# Patient Record
Sex: FEMALE | Race: WHITE | NOT HISPANIC OR LATINO | URBAN - METROPOLITAN AREA
[De-identification: names, ages, dates, MRNs, and addresses within clinical notes are randomized per-mention and may not be internally consistent; named-entity substitution may affect disease eponyms.]

---

## 2018-02-15 ENCOUNTER — EMERGENCY (EMERGENCY)
Facility: HOSPITAL | Age: 43
LOS: 0 days | Discharge: HOME | End: 2018-02-15
Attending: EMERGENCY MEDICINE

## 2018-02-15 VITALS
SYSTOLIC BLOOD PRESSURE: 145 MMHG | TEMPERATURE: 97 F | HEIGHT: 63 IN | DIASTOLIC BLOOD PRESSURE: 91 MMHG | WEIGHT: 235.01 LBS | RESPIRATION RATE: 20 BRPM | HEART RATE: 90 BPM | OXYGEN SATURATION: 100 %

## 2018-02-15 VITALS
SYSTOLIC BLOOD PRESSURE: 138 MMHG | DIASTOLIC BLOOD PRESSURE: 67 MMHG | RESPIRATION RATE: 18 BRPM | HEART RATE: 87 BPM | TEMPERATURE: 97 F

## 2018-02-15 DIAGNOSIS — R10.13 EPIGASTRIC PAIN: ICD-10-CM

## 2018-02-15 DIAGNOSIS — R07.9 CHEST PAIN, UNSPECIFIED: ICD-10-CM

## 2018-02-15 LAB
ALBUMIN SERPL ELPH-MCNC: 3.7 G/DL — SIGNIFICANT CHANGE UP (ref 3–5.5)
ALP SERPL-CCNC: 80 U/L — SIGNIFICANT CHANGE UP (ref 30–115)
ALT FLD-CCNC: 16 U/L — SIGNIFICANT CHANGE UP (ref 0–41)
ANION GAP SERPL CALC-SCNC: 7 MMOL/L — SIGNIFICANT CHANGE UP (ref 7–14)
AST SERPL-CCNC: 17 U/L — SIGNIFICANT CHANGE UP (ref 0–41)
BASOPHILS # BLD AUTO: 0.05 K/UL — SIGNIFICANT CHANGE UP (ref 0–0.2)
BASOPHILS NFR BLD AUTO: 0.4 % — SIGNIFICANT CHANGE UP (ref 0–1)
BILIRUB SERPL-MCNC: 0.5 MG/DL — SIGNIFICANT CHANGE UP (ref 0.2–1.2)
BUN SERPL-MCNC: 13 MG/DL — SIGNIFICANT CHANGE UP (ref 10–20)
CALCIUM SERPL-MCNC: 9.1 MG/DL — SIGNIFICANT CHANGE UP (ref 8.5–10.1)
CHLORIDE SERPL-SCNC: 105 MMOL/L — SIGNIFICANT CHANGE UP (ref 98–110)
CK MB CFR SERPL CALC: 1.3 NG/ML — SIGNIFICANT CHANGE UP (ref 0.6–6.3)
CO2 SERPL-SCNC: 27 MMOL/L — SIGNIFICANT CHANGE UP (ref 17–32)
CREAT SERPL-MCNC: 0.8 MG/DL — SIGNIFICANT CHANGE UP (ref 0.7–1.5)
EOSINOPHIL # BLD AUTO: 0.22 K/UL — SIGNIFICANT CHANGE UP (ref 0–0.7)
EOSINOPHIL NFR BLD AUTO: 1.9 % — SIGNIFICANT CHANGE UP (ref 0–8)
GLUCOSE SERPL-MCNC: 110 MG/DL — SIGNIFICANT CHANGE UP (ref 70–110)
HCT VFR BLD CALC: 40.3 % — SIGNIFICANT CHANGE UP (ref 37–47)
HGB BLD-MCNC: 13.5 G/DL — LOW (ref 14–18)
IMM GRANULOCYTES NFR BLD AUTO: 0.7 % — HIGH (ref 0.1–0.3)
LYMPHOCYTES # BLD AUTO: 1.71 K/UL — SIGNIFICANT CHANGE UP (ref 1.2–3.4)
LYMPHOCYTES # BLD AUTO: 14.8 % — LOW (ref 20.5–51.1)
MCHC RBC-ENTMCNC: 26.2 PG — LOW (ref 27–31)
MCHC RBC-ENTMCNC: 33.5 G/DL — SIGNIFICANT CHANGE UP (ref 32–37)
MCV RBC AUTO: 78.1 FL — LOW (ref 81–91)
MONOCYTES # BLD AUTO: 0.47 K/UL — SIGNIFICANT CHANGE UP (ref 0.1–0.6)
MONOCYTES NFR BLD AUTO: 4.1 % — SIGNIFICANT CHANGE UP (ref 1.7–9.3)
NEUTROPHILS # BLD AUTO: 9.04 K/UL — HIGH (ref 1.4–6.5)
NEUTROPHILS NFR BLD AUTO: 78.1 % — HIGH (ref 42.2–75.2)
NRBC # BLD: 0 /100 WBCS — SIGNIFICANT CHANGE UP (ref 0–0)
PLATELET # BLD AUTO: 279 K/UL — SIGNIFICANT CHANGE UP (ref 130–400)
POTASSIUM SERPL-MCNC: 3.8 MMOL/L — SIGNIFICANT CHANGE UP (ref 3.5–5)
POTASSIUM SERPL-SCNC: 3.8 MMOL/L — SIGNIFICANT CHANGE UP (ref 3.5–5)
PROT SERPL-MCNC: 7 G/DL — SIGNIFICANT CHANGE UP (ref 6–8)
RBC # BLD: 5.16 M/UL — SIGNIFICANT CHANGE UP (ref 4.2–5.4)
RBC # FLD: 13.1 % — SIGNIFICANT CHANGE UP (ref 11.5–14.5)
SODIUM SERPL-SCNC: 139 MMOL/L — SIGNIFICANT CHANGE UP (ref 135–146)
TROPONIN I SERPL-MCNC: <0.02 NG/ML — SIGNIFICANT CHANGE UP (ref 0–0.05)
WBC # BLD: 11.57 K/UL — HIGH (ref 4.8–10.8)
WBC # FLD AUTO: 11.57 K/UL — HIGH (ref 4.8–10.8)

## 2018-02-15 RX ORDER — ASPIRIN/CALCIUM CARB/MAGNESIUM 324 MG
162 TABLET ORAL ONCE
Qty: 0 | Refills: 0 | Status: COMPLETED | OUTPATIENT
Start: 2018-02-15 | End: 2018-02-15

## 2018-02-15 RX ORDER — FAMOTIDINE 10 MG/ML
20 INJECTION INTRAVENOUS ONCE
Qty: 0 | Refills: 0 | Status: COMPLETED | OUTPATIENT
Start: 2018-02-15 | End: 2018-02-15

## 2018-02-15 RX ADMIN — Medication 162 MILLIGRAM(S): at 05:13

## 2018-02-15 RX ADMIN — FAMOTIDINE 20 MILLIGRAM(S): 10 INJECTION INTRAVENOUS at 05:13

## 2018-02-15 NOTE — ED PROVIDER NOTE - NS ED ROS FT
GENERAL: Denies fever/chills, loss of appetite/weight or fatigue.  SKIN: Denies rashes, abrasions, lacerations, ecchymosis, erythema, or edema.  HEAD: Denies headache, dizziness or trauma.  ENT: Denies earaches, discharge or hearing loss. Denies nasal discharge or epistaxis. Denies sore throat.   CARDIAC: + chest pain.   RESPIRATORY: Denies SOB, cough, hemoptysis or wheezing.   GI: Denies abdominal pain, n/v/d, bloody stools or melena.   MSK: Denies myalgias, bony deformity or pain.   NEURO: Denies paresthesias, tingling or weakness.

## 2018-02-15 NOTE — ED PROVIDER NOTE - OBJECTIVE STATEMENT
Pt is a 41 y/o Female, no PMHX, presents to ED with epigastric/abdominal pain since 7PM. Pt reports she took 81mg of ASA with no relief of symptoms. Pt reports she does have a hx of heartburn, but this felt different and kept her from sleeping so she decided to come to the ED for eval. Reports symptoms are not positional or exertional. Pt denies cardiac hx, family hx of cardiac disease, smoking, recent travel/sx, hx o DVT/PE, fever, chills, SOB, URI symptoms, abdominal pain, n/v/d, urinary symptoms.

## 2018-02-15 NOTE — ED PROVIDER NOTE - PROGRESS NOTE DETAILS
pt  continues to feel well - discussed and prionted  labs ekg and cxr for patient.  Patient does nto want to stay for second se of enzymes-  she will follow up

## 2018-02-15 NOTE — ED PROVIDER NOTE - ATTENDING CONTRIBUTION TO CARE
42yF  no pmhx non smoker no family hx of cardiac disease, no hx of cardiac disease herself, p/w indigestion, burning epigastric pain.  No SOB no CP no loc  no vomiting- no diarrhea  no change in stool color,  not worse on exertion, PE: alert well appaering cvs rrr reps cta abd soft nontender cvs rrr resp cta  abd soft nonteder  2+ radiail pulse b/l equal , no le edema Plan: labs  ekg cxr, low heart score - reasses

## 2018-02-15 NOTE — ED PROVIDER NOTE - PHYSICAL EXAMINATION
CONSTITUTIONAL: Awake, alert. Well-developed; well-nourished; in no distress. Non-toxic appearing.   SKIN: No rash, vesicles/lesion, abrasions or lacerations. Cap refill < 2 secs.   HEAD: Normocephalic; atraumatic.   EYES: Symmetrical, no discharge or signs of trauma. Conjunctiva and sclera clear.    ENT: Airway patent. Oropharynx clear with no erythema, exudates or tonsillar enlargement. Uvula midline.   NECK: Supple; non-tender.   CARD: No chest wall deformity or tenderness. S1, S2 normal; no murmurs, gallops, or rubs. Regular rate and rhythm.  RESP: Good air movement. Lungs CTAB. No crackles, wheezes, rales or rhonchi.  ABD: Soft; non-distended; non-tender.   EXT: No bony deformity or tenderness. Normal ROM x 4 extremities.   PSYCH: Cooperative, appropriate.

## 2018-02-15 NOTE — ED ADULT NURSE REASSESSMENT NOTE - COMFORT CARE
ambulated to bathroom/plan of care explained/repositioned/wait time explained/side rails up/warm blanket provided

## 2021-07-31 ENCOUNTER — TRANSCRIPTION ENCOUNTER (OUTPATIENT)
Age: 46
End: 2021-07-31

## 2021-11-19 NOTE — ED PROVIDER NOTE - NSTIMEPROVIDERCAREINITIATE_GEN_ER
Patient was advised of below results and recommendations per Dr. Olmstead.   Patient understood results without questions.  Script sent to patient preferred pharmacy.  Form at  for patient to complete.     15-Feb-2018 04:43

## 2022-01-07 ENCOUNTER — TRANSCRIPTION ENCOUNTER (OUTPATIENT)
Age: 47
End: 2022-01-07

## 2022-04-02 ENCOUNTER — TRANSCRIPTION ENCOUNTER (OUTPATIENT)
Age: 47
End: 2022-04-02

## 2022-04-20 ENCOUNTER — TRANSCRIPTION ENCOUNTER (OUTPATIENT)
Age: 47
End: 2022-04-20

## 2022-05-21 ENCOUNTER — EMERGENCY (EMERGENCY)
Facility: HOSPITAL | Age: 47
LOS: 0 days | Discharge: HOME | End: 2022-05-21
Attending: EMERGENCY MEDICINE | Admitting: EMERGENCY MEDICINE
Payer: COMMERCIAL

## 2022-05-21 VITALS
HEIGHT: 63 IN | TEMPERATURE: 97 F | HEART RATE: 77 BPM | WEIGHT: 229.94 LBS | OXYGEN SATURATION: 98 % | SYSTOLIC BLOOD PRESSURE: 158 MMHG | DIASTOLIC BLOOD PRESSURE: 85 MMHG | RESPIRATION RATE: 20 BRPM

## 2022-05-21 VITALS
HEART RATE: 70 BPM | DIASTOLIC BLOOD PRESSURE: 83 MMHG | SYSTOLIC BLOOD PRESSURE: 122 MMHG | TEMPERATURE: 98 F | RESPIRATION RATE: 16 BRPM | OXYGEN SATURATION: 99 %

## 2022-05-21 DIAGNOSIS — N20.0 CALCULUS OF KIDNEY: ICD-10-CM

## 2022-05-21 DIAGNOSIS — R11.2 NAUSEA WITH VOMITING, UNSPECIFIED: ICD-10-CM

## 2022-05-21 DIAGNOSIS — Z86.2 PERSONAL HISTORY OF DISEASES OF THE BLOOD AND BLOOD-FORMING ORGANS AND CERTAIN DISORDERS INVOLVING THE IMMUNE MECHANISM: ICD-10-CM

## 2022-05-21 DIAGNOSIS — K58.9 IRRITABLE BOWEL SYNDROME WITHOUT DIARRHEA: ICD-10-CM

## 2022-05-21 DIAGNOSIS — R10.12 LEFT UPPER QUADRANT PAIN: ICD-10-CM

## 2022-05-21 LAB
ALBUMIN SERPL ELPH-MCNC: 4.5 G/DL — SIGNIFICANT CHANGE UP (ref 3.5–5.2)
ALP SERPL-CCNC: 104 U/L — SIGNIFICANT CHANGE UP (ref 30–115)
ALT FLD-CCNC: 18 U/L — SIGNIFICANT CHANGE UP (ref 0–41)
ANION GAP SERPL CALC-SCNC: 10 MMOL/L — SIGNIFICANT CHANGE UP (ref 7–14)
APPEARANCE UR: ABNORMAL
AST SERPL-CCNC: 18 U/L — SIGNIFICANT CHANGE UP (ref 0–41)
BACTERIA # UR AUTO: ABNORMAL
BASOPHILS # BLD AUTO: 0.04 K/UL — SIGNIFICANT CHANGE UP (ref 0–0.2)
BASOPHILS NFR BLD AUTO: 0.3 % — SIGNIFICANT CHANGE UP (ref 0–1)
BILIRUB SERPL-MCNC: 0.4 MG/DL — SIGNIFICANT CHANGE UP (ref 0.2–1.2)
BILIRUB UR-MCNC: NEGATIVE — SIGNIFICANT CHANGE UP
BUN SERPL-MCNC: 15 MG/DL — SIGNIFICANT CHANGE UP (ref 10–20)
CALCIUM SERPL-MCNC: 9.4 MG/DL — SIGNIFICANT CHANGE UP (ref 8.5–10.1)
CHLORIDE SERPL-SCNC: 104 MMOL/L — SIGNIFICANT CHANGE UP (ref 98–110)
CO2 SERPL-SCNC: 26 MMOL/L — SIGNIFICANT CHANGE UP (ref 17–32)
COD CRY URNS QL: NEGATIVE — SIGNIFICANT CHANGE UP
COLOR SPEC: YELLOW — SIGNIFICANT CHANGE UP
CREAT SERPL-MCNC: 0.7 MG/DL — SIGNIFICANT CHANGE UP (ref 0.7–1.5)
DIFF PNL FLD: ABNORMAL
EGFR: 106 ML/MIN/1.73M2 — SIGNIFICANT CHANGE UP
EOSINOPHIL # BLD AUTO: 0.29 K/UL — SIGNIFICANT CHANGE UP (ref 0–0.7)
EOSINOPHIL NFR BLD AUTO: 2.5 % — SIGNIFICANT CHANGE UP (ref 0–8)
EPI CELLS # UR: ABNORMAL /HPF
GLUCOSE SERPL-MCNC: 123 MG/DL — HIGH (ref 70–99)
GLUCOSE UR QL: NEGATIVE MG/DL — SIGNIFICANT CHANGE UP
GRAN CASTS # UR COMP ASSIST: NEGATIVE — SIGNIFICANT CHANGE UP
HCT VFR BLD CALC: 43 % — SIGNIFICANT CHANGE UP (ref 37–47)
HGB BLD-MCNC: 14.3 G/DL — SIGNIFICANT CHANGE UP (ref 12–16)
HYALINE CASTS # UR AUTO: NEGATIVE — SIGNIFICANT CHANGE UP
IMM GRANULOCYTES NFR BLD AUTO: 0.8 % — HIGH (ref 0.1–0.3)
KETONES UR-MCNC: NEGATIVE — SIGNIFICANT CHANGE UP
LEUKOCYTE ESTERASE UR-ACNC: ABNORMAL
LIDOCAIN IGE QN: 35 U/L — SIGNIFICANT CHANGE UP (ref 7–60)
LYMPHOCYTES # BLD AUTO: 1.03 K/UL — LOW (ref 1.2–3.4)
LYMPHOCYTES # BLD AUTO: 9 % — LOW (ref 20.5–51.1)
MCHC RBC-ENTMCNC: 26.6 PG — LOW (ref 27–31)
MCHC RBC-ENTMCNC: 33.3 G/DL — SIGNIFICANT CHANGE UP (ref 32–37)
MCV RBC AUTO: 79.9 FL — LOW (ref 81–99)
MONOCYTES # BLD AUTO: 0.45 K/UL — SIGNIFICANT CHANGE UP (ref 0.1–0.6)
MONOCYTES NFR BLD AUTO: 3.9 % — SIGNIFICANT CHANGE UP (ref 1.7–9.3)
NEUTROPHILS # BLD AUTO: 9.6 K/UL — HIGH (ref 1.4–6.5)
NEUTROPHILS NFR BLD AUTO: 83.5 % — HIGH (ref 42.2–75.2)
NITRITE UR-MCNC: NEGATIVE — SIGNIFICANT CHANGE UP
NRBC # BLD: 0 /100 WBCS — SIGNIFICANT CHANGE UP (ref 0–0)
PH UR: 6 — SIGNIFICANT CHANGE UP (ref 5–8)
PLATELET # BLD AUTO: 267 K/UL — SIGNIFICANT CHANGE UP (ref 130–400)
POTASSIUM SERPL-MCNC: 5.4 MMOL/L — HIGH (ref 3.5–5)
POTASSIUM SERPL-SCNC: 5.4 MMOL/L — HIGH (ref 3.5–5)
PROT SERPL-MCNC: 7 G/DL — SIGNIFICANT CHANGE UP (ref 6–8)
PROT UR-MCNC: 30 MG/DL
RBC # BLD: 5.38 M/UL — SIGNIFICANT CHANGE UP (ref 4.2–5.4)
RBC # FLD: 13.2 % — SIGNIFICANT CHANGE UP (ref 11.5–14.5)
RBC CASTS # UR COMP ASSIST: ABNORMAL /HPF
SODIUM SERPL-SCNC: 140 MMOL/L — SIGNIFICANT CHANGE UP (ref 135–146)
SP GR SPEC: 1.02 — SIGNIFICANT CHANGE UP (ref 1.01–1.03)
TRI-PHOS CRY UR QL COMP ASSIST: NEGATIVE — SIGNIFICANT CHANGE UP
URATE CRY FLD QL MICRO: NEGATIVE — SIGNIFICANT CHANGE UP
UROBILINOGEN FLD QL: 0.2 MG/DL — SIGNIFICANT CHANGE UP
WBC # BLD: 11.5 K/UL — HIGH (ref 4.8–10.8)
WBC # FLD AUTO: 11.5 K/UL — HIGH (ref 4.8–10.8)
WBC UR QL: ABNORMAL /HPF

## 2022-05-21 PROCEDURE — 99285 EMERGENCY DEPT VISIT HI MDM: CPT

## 2022-05-21 PROCEDURE — 74176 CT ABD & PELVIS W/O CONTRAST: CPT | Mod: 26,MA

## 2022-05-21 RX ORDER — CEFDINIR 250 MG/5ML
1 POWDER, FOR SUSPENSION ORAL
Qty: 14 | Refills: 0
Start: 2022-05-21 | End: 2022-05-27

## 2022-05-21 RX ORDER — KETOROLAC TROMETHAMINE 30 MG/ML
15 SYRINGE (ML) INJECTION ONCE
Refills: 0 | Status: DISCONTINUED | OUTPATIENT
Start: 2022-05-21 | End: 2022-05-21

## 2022-05-21 RX ORDER — ONDANSETRON 8 MG/1
4 TABLET, FILM COATED ORAL ONCE
Refills: 0 | Status: COMPLETED | OUTPATIENT
Start: 2022-05-21 | End: 2022-05-21

## 2022-05-21 RX ORDER — KETOROLAC TROMETHAMINE 30 MG/ML
1 SYRINGE (ML) INJECTION
Qty: 8 | Refills: 0
Start: 2022-05-21 | End: 2022-05-22

## 2022-05-21 RX ORDER — TAMSULOSIN HYDROCHLORIDE 0.4 MG/1
1 CAPSULE ORAL
Qty: 7 | Refills: 0
Start: 2022-05-21 | End: 2022-05-27

## 2022-05-21 RX ORDER — SODIUM CHLORIDE 9 MG/ML
1000 INJECTION INTRAMUSCULAR; INTRAVENOUS; SUBCUTANEOUS ONCE
Refills: 0 | Status: COMPLETED | OUTPATIENT
Start: 2022-05-21 | End: 2022-05-21

## 2022-05-21 RX ADMIN — SODIUM CHLORIDE 1000 MILLILITER(S): 9 INJECTION INTRAMUSCULAR; INTRAVENOUS; SUBCUTANEOUS at 08:13

## 2022-05-21 RX ADMIN — Medication 15 MILLIGRAM(S): at 08:13

## 2022-05-21 RX ADMIN — ONDANSETRON 4 MILLIGRAM(S): 8 TABLET, FILM COATED ORAL at 08:13

## 2022-05-21 NOTE — ED PROVIDER NOTE - ATTENDING APP SHARED VISIT CONTRIBUTION OF CARE
47-year-old female past medical history as documented presenting with cute onset of left flank pain that began this morning described as achy, radiating to the left upper quadrant, no palliative or provocative factors, severe.  Denies having this before.  Pain associated with nausea and several episodes of vomiting.  Otherwise denies fevers, diarrhea, urinary symptoms, blood in stool, or any other complaints.    Vital Signs: I have reviewed the initial vital signs.  Constitutional: NAD, well-nourished, appears stated age, no acute distress.  HEENT: Airway patent, moist MM, no erythema/swelling/deformity of oral structures. EOMI, PERRLA.  CV: regular rate, regular rhythm, well-perfused extremities, 2+ b/l DP and radial pulses equal.  Lungs: BCTA, no increased WOB.  ABD: NTND, no guarding or rebound, no pulsatile mass, no hernias. (+) L CVA tenderness  MSK: Neck supple, nontender, nl ROM, no stepoff. Chest nontender. Back nontender in TLS spine or to b/l bony structures or flanks. Ext nontender, nl rom, no deformity.   INTEG: Skin warm, dry, no rash.  NEURO: A&Ox3, normal strength, nl sensation throughout, normal speech.   PSYCH: Calm, cooperative, normal affect and interaction.    Will obtain labs, CT abdomen pelvis, UA, symptomatic control as needed, reeval.

## 2022-05-21 NOTE — ED PROVIDER NOTE - CLINICAL SUMMARY MEDICAL DECISION MAKING FREE TEXT BOX
Patient presented with L flank pain since this AM. (+) tender on CVA exam but otherwise afebrile, HD stable, well appearing. Obtained labs which were grossly unremarkable including no significant leukocytosis, anemia, signs of dehydration/TERA, transaminitis or significant electrolyte abnormalities. UA likely contaminated but grossly borderline for infection. CT abd/pelvis showed L nephrolithiasis which is likely etiology of pain but otherwise negative for emergent processes. Patient felt much better after tx in ED, and serial abdominal exams benign. Able to tolerate PO. Given the above, will discharge home with outpatient follow up. Patient agreeable with plan. Agrees to return to ED for any new or worsening symptoms.

## 2022-05-21 NOTE — ED PROVIDER NOTE - PHYSICAL EXAMINATION
Gen: Alert, NAD, well appearing  Head: NC, AT, PERRL, EOMI, normal lids/conjunctiva  ENT: normal hearing  Neck: +supple, no tenderness/meningismus,  Pulm: Bilateral BS, normal resp effort, no wheeze/stridor/retractions  CV: RRR  Abd: soft, NT/ND. NO guarding or rebound  Mskel: no edema/erythema/cyanosis  Skin: no rash, warm/dry  Neuro: AAOx3, no sensory/motor deficits

## 2022-05-21 NOTE — ED PROVIDER NOTE - NS ED ATTENDING STATEMENT MOD
This was a shared visit with the LEYDA. I reviewed and verified the documentation and independently performed the documented:

## 2022-05-21 NOTE — ED PROVIDER NOTE - OBJECTIVE STATEMENT
48 yo F hx of IBS, enlarged spleen c/o left upper quadrant abdominal pain since 5:30am. Pain is waxing and waning, was severe and now mild. +n/v. No f/c/c/d. No hematuria or dysuria. LMP 5/1/22.

## 2022-05-21 NOTE — ED PROVIDER NOTE - CARE PROVIDER_API CALL
Jose Luis Hein)  Urology  [address] [place] [address], [address]  [place] [address]  Phone: [phone]  Fax: [phone]  Follow Up Time: 1-3 Days

## 2022-05-21 NOTE — ED PROVIDER NOTE - PATIENT PORTAL LINK FT
You can access the FollowMyHealth Patient Portal offered by [facility] by registering at the following website: [email or web address]. By joining [facility]’s FollowMyHealth portal, you will also be able to view your health information using other applications (apps) compatible with our system.

## 2022-05-21 NOTE — ED ADULT NURSE NOTE - NSICDXPASTMEDICALHX_GEN_ALL_CORE_FT
PAST MEDICAL HISTORY:  IBS (irritable bowel syndrome)     Spleen enlarged      PAST MEDICAL HISTORY:  IBS (irritable bowel syndrome)     Kidney stones     Spleen enlarged

## 2022-05-22 LAB
CULTURE RESULTS: SIGNIFICANT CHANGE UP
SPECIMEN SOURCE: SIGNIFICANT CHANGE UP

## 2022-05-23 PROBLEM — K58.9 IRRITABLE BOWEL SYNDROME WITHOUT DIARRHEA: Chronic | Status: ACTIVE | Noted: 2022-05-21

## 2022-05-23 PROBLEM — N20.0 CALCULUS OF KIDNEY: Chronic | Status: ACTIVE | Noted: 2022-05-21

## 2022-05-23 PROBLEM — R16.1 SPLENOMEGALY, NOT ELSEWHERE CLASSIFIED: Chronic | Status: ACTIVE | Noted: 2022-05-21

## 2022-05-25 ENCOUNTER — NON-APPOINTMENT (OUTPATIENT)
Age: 47
End: 2022-05-25

## 2022-07-18 ENCOUNTER — LABORATORY RESULT (OUTPATIENT)
Age: 47
End: 2022-07-18

## 2022-07-19 ENCOUNTER — RESULT REVIEW (OUTPATIENT)
Age: 47
End: 2022-07-19

## 2022-07-19 ENCOUNTER — APPOINTMENT (OUTPATIENT)
Dept: UROLOGY | Facility: CLINIC | Age: 47
End: 2022-07-19

## 2022-07-19 VITALS — WEIGHT: 235 LBS | BODY MASS INDEX: 41.64 KG/M2 | TEMPERATURE: 98 F | HEIGHT: 63 IN

## 2022-07-19 DIAGNOSIS — Z82.49 FAMILY HISTORY OF ISCHEMIC HEART DISEASE AND OTHER DISEASES OF THE CIRCULATORY SYSTEM: ICD-10-CM

## 2022-07-19 DIAGNOSIS — Z78.9 OTHER SPECIFIED HEALTH STATUS: ICD-10-CM

## 2022-07-19 DIAGNOSIS — N20.1 CALCULUS OF URETER: ICD-10-CM

## 2022-07-19 PROCEDURE — 99204 OFFICE O/P NEW MOD 45 MIN: CPT

## 2022-07-19 NOTE — HISTORY OF PRESENT ILLNESS
[FreeTextEntry1] : ELIZABETH GAFFNEY is a 47 year old female who presents for consultation for kidney stones\par \par The pt reports waking up with intense Lower Flank Pain and presented in the ER in May where she was dx with a ureteral stone . She has since passed the stone which measures about 4mm . The pain has since reslved and she\par denies gross hematuria, dysuria or associated symptoms.  She reports  having a kidney stone once abput 10 years ago while pregnant \par \par Ct abdomen pelvis 05/2022 images visualized\par \par mild hydroureteronephrosis 4mm distal stone , there are no stones in the kidney bilaterally \par labs 05/22\par U/A -26-50 RBCs \par culture - no growth \par creatinine- 0.7\par calcium -9.4\par \par Denies  PMH including recurrent UTIs. had one prior episode obstructing stone when pregnant many years ago\par Family History: No  malignancies\par Social History: she is a  , is a non smoker \par

## 2022-07-19 NOTE — ADDENDUM
[FreeTextEntry1] : Patient's note was transcribed with the assistance of a medical scribe under the supervision of Dr. Hein.\par I, Dr. Hein, have reviewed the patient's chart and agree that it aligns with my medical decisions.\par Ruma Sanchez, our scribe, also served as a chaperone for physical examination purposes.\par \par \par

## 2022-07-19 NOTE — ASSESSMENT
[FreeTextEntry1] : ELIZABETH GAFFNEY is a 47 year old female who presents for consultation for left ureteral stone which pt passed second stone episode\par \par We discussed the next steps of care including \par - a stone prevention diet , focusing on hydration , dc mutlvit\par - litholink pt declines\par -Renal US\par -stone analysis\par repeat ua assess microheme resolution \par fu telemed

## 2022-07-21 ENCOUNTER — NON-APPOINTMENT (OUTPATIENT)
Age: 47
End: 2022-07-21

## 2022-07-29 LAB — NIDUS STONE QN: NORMAL

## 2022-08-04 ENCOUNTER — OUTPATIENT (OUTPATIENT)
Dept: OUTPATIENT SERVICES | Facility: HOSPITAL | Age: 47
LOS: 1 days | Discharge: HOME | End: 2022-08-04

## 2022-08-04 ENCOUNTER — NON-APPOINTMENT (OUTPATIENT)
Age: 47
End: 2022-08-04

## 2022-08-04 DIAGNOSIS — N20.1 CALCULUS OF URETER: ICD-10-CM

## 2022-08-04 PROCEDURE — 76775 US EXAM ABDO BACK WALL LIM: CPT | Mod: 26

## 2022-08-30 ENCOUNTER — APPOINTMENT (OUTPATIENT)
Dept: UROLOGY | Facility: CLINIC | Age: 47
End: 2022-08-30

## 2022-08-30 DIAGNOSIS — R31.29 OTHER MICROSCOPIC HEMATURIA: ICD-10-CM

## 2022-08-30 DIAGNOSIS — N20.0 CALCULUS OF KIDNEY: ICD-10-CM

## 2022-08-30 DIAGNOSIS — Z87.442 PERSONAL HISTORY OF URINARY CALCULI: ICD-10-CM

## 2022-08-30 PROCEDURE — 99214 OFFICE O/P EST MOD 30 MIN: CPT | Mod: 95

## 2022-08-30 NOTE — ADDENDUM
[FreeTextEntry1] : The patient-doctor relationship has been established in a face to face fashion via real time video/audio HIPAA compliant communication using telemedicine software. The patient's identity has been confirmed. The patient was previously emailed a copy of the telemedicine consent. They have had a chance to review and has now given verbal consent and has requested care to be assessed and treated through telemedicine and understands there may be limitations in this process and they may need further follow up care in the office and or hospital settings.\par We were unsuccessful at connecting with [employer] noe and therefore elected to use the HIPAA compliant [employer] video  successfully.\par

## 2022-08-30 NOTE — HISTORY OF PRESENT ILLNESS
[Home] : at home, [unfilled] , at the time of the visit. [Medical Office: ([facility])___] : at the medical office located in  [Verbal consent obtained from patient] : the patient, [unfilled] [FreeTextEntry1] : ELIZABETH GAFFNEY is a 47 year old female who presents for consultation for left ureteral stone which pt passed second stone episode\par \par Patient doing well denies any flank pain no hematuria.\par \par RBUS 08/04/22 images visualized -  Right kidney: 10.3 cm. No hydronephrosis or calculi.Left kidney:  12.2 cm. No hydronephrosis or calculi. Resolved previous left hydronephrosis.\par \par Calculus analysis 07/21/22 - \par 80% CaOX monohydrate \par 20 % CaPh (apatite) \par \par Urine studies 07/21/22 \par U/A -Trace blood , large leuks below lab threshold microhematuria\par \par previously \par Ct abdomen pelvis 05/2022 images visualized\par \par mild hydroureteronephrosis 4mm distal stone , there are no stones in the kidney bilaterally \par labs 05/22\par U/A -26-50 RBCs \par culture - no growth \par creatinine- 0.7\par calcium -9.4\par \par Denies  PMH including recurrent UTIs. had one prior episode obstructing stone when pregnant many years ago\par Family History: No  malignancies\par Social History: she is a  , is a non smoker \par

## 2022-08-30 NOTE — ASSESSMENT
[FreeTextEntry1] : ELIZABETH GAFFNEY is a 47 year old female who presents for consultation for left ureteral stone which pt passed second stone episode.\par Ultrasound confirms resolution of hydronephrosis\par \par Stone dietary prevention given.\par -Patient previously declined metabolic work-up \par She will follow-up if recurrence of symptoms or hematuria etc.\par \par \par Stone nutritional counseling given--First and foremost, the most important recommendation is to increase water intake. I have recommended that she drink enough water to produce 2.5L of urine per day. More easily put, I have recommended the patient consume enough water to keep Her urine clear. I have also recommended adding crystal light (or lemon) which contains citrate which prevents stone formation. I have also stressed the importance of minimizing salt and animal protein in the diet.\par

## 2023-06-21 ENCOUNTER — EMERGENCY (EMERGENCY)
Facility: HOSPITAL | Age: 48
LOS: 0 days | Discharge: HOME | End: 2023-06-21
Attending: EMERGENCY MEDICINE
Payer: COMMERCIAL

## 2023-06-21 VITALS
SYSTOLIC BLOOD PRESSURE: 166 MMHG | DIASTOLIC BLOOD PRESSURE: 86 MMHG | RESPIRATION RATE: 20 BRPM | OXYGEN SATURATION: 100 % | WEIGHT: 229.94 LBS | HEIGHT: 63 IN | HEART RATE: 89 BPM | TEMPERATURE: 98 F

## 2023-06-21 VITALS
RESPIRATION RATE: 16 BRPM | HEART RATE: 80 BPM | SYSTOLIC BLOOD PRESSURE: 140 MMHG | OXYGEN SATURATION: 100 % | DIASTOLIC BLOOD PRESSURE: 83 MMHG

## 2023-06-21 DIAGNOSIS — R07.81 PLEURODYNIA: ICD-10-CM

## 2023-06-21 DIAGNOSIS — N64.4 MASTODYNIA: ICD-10-CM

## 2023-06-21 DIAGNOSIS — K58.9 IRRITABLE BOWEL SYNDROME WITHOUT DIARRHEA: ICD-10-CM

## 2023-06-21 DIAGNOSIS — Z87.442 PERSONAL HISTORY OF URINARY CALCULI: ICD-10-CM

## 2023-06-21 DIAGNOSIS — R07.89 OTHER CHEST PAIN: ICD-10-CM

## 2023-06-21 LAB
ALBUMIN SERPL ELPH-MCNC: 4.2 G/DL — SIGNIFICANT CHANGE UP (ref 3.5–5.2)
ALP SERPL-CCNC: 108 U/L — SIGNIFICANT CHANGE UP (ref 30–115)
ALT FLD-CCNC: 15 U/L — SIGNIFICANT CHANGE UP (ref 0–41)
ANION GAP SERPL CALC-SCNC: 11 MMOL/L — SIGNIFICANT CHANGE UP (ref 7–14)
AST SERPL-CCNC: 16 U/L — SIGNIFICANT CHANGE UP (ref 0–41)
BASOPHILS # BLD AUTO: 0.08 K/UL — SIGNIFICANT CHANGE UP (ref 0–0.2)
BASOPHILS NFR BLD AUTO: 0.6 % — SIGNIFICANT CHANGE UP (ref 0–1)
BILIRUB SERPL-MCNC: 0.4 MG/DL — SIGNIFICANT CHANGE UP (ref 0.2–1.2)
BUN SERPL-MCNC: 18 MG/DL — SIGNIFICANT CHANGE UP (ref 10–20)
CALCIUM SERPL-MCNC: 9.5 MG/DL — SIGNIFICANT CHANGE UP (ref 8.4–10.5)
CHLORIDE SERPL-SCNC: 102 MMOL/L — SIGNIFICANT CHANGE UP (ref 98–110)
CO2 SERPL-SCNC: 26 MMOL/L — SIGNIFICANT CHANGE UP (ref 17–32)
CREAT SERPL-MCNC: 0.7 MG/DL — SIGNIFICANT CHANGE UP (ref 0.7–1.5)
D DIMER BLD IA.RAPID-MCNC: 157 NG/ML DDU — SIGNIFICANT CHANGE UP
EGFR: 105 ML/MIN/1.73M2 — SIGNIFICANT CHANGE UP
EOSINOPHIL # BLD AUTO: 0.77 K/UL — HIGH (ref 0–0.7)
EOSINOPHIL NFR BLD AUTO: 6 % — SIGNIFICANT CHANGE UP (ref 0–8)
GLUCOSE SERPL-MCNC: 115 MG/DL — HIGH (ref 70–99)
HCT VFR BLD CALC: 39.6 % — SIGNIFICANT CHANGE UP (ref 37–47)
HGB BLD-MCNC: 13.1 G/DL — SIGNIFICANT CHANGE UP (ref 12–16)
IMM GRANULOCYTES NFR BLD AUTO: 0.6 % — HIGH (ref 0.1–0.3)
LYMPHOCYTES # BLD AUTO: 1.63 K/UL — SIGNIFICANT CHANGE UP (ref 1.2–3.4)
LYMPHOCYTES # BLD AUTO: 12.8 % — LOW (ref 20.5–51.1)
MAGNESIUM SERPL-MCNC: 2 MG/DL — SIGNIFICANT CHANGE UP (ref 1.8–2.4)
MCHC RBC-ENTMCNC: 26.3 PG — LOW (ref 27–31)
MCHC RBC-ENTMCNC: 33.1 G/DL — SIGNIFICANT CHANGE UP (ref 32–37)
MCV RBC AUTO: 79.4 FL — LOW (ref 81–99)
MONOCYTES # BLD AUTO: 0.69 K/UL — HIGH (ref 0.1–0.6)
MONOCYTES NFR BLD AUTO: 5.4 % — SIGNIFICANT CHANGE UP (ref 1.7–9.3)
NEUTROPHILS # BLD AUTO: 9.53 K/UL — HIGH (ref 1.4–6.5)
NEUTROPHILS NFR BLD AUTO: 74.6 % — SIGNIFICANT CHANGE UP (ref 42.2–75.2)
NRBC # BLD: 0 /100 WBCS — SIGNIFICANT CHANGE UP (ref 0–0)
PLATELET # BLD AUTO: 271 K/UL — SIGNIFICANT CHANGE UP (ref 130–400)
PMV BLD: 9.6 FL — SIGNIFICANT CHANGE UP (ref 7.4–10.4)
POTASSIUM SERPL-MCNC: 4.6 MMOL/L — SIGNIFICANT CHANGE UP (ref 3.5–5)
POTASSIUM SERPL-SCNC: 4.6 MMOL/L — SIGNIFICANT CHANGE UP (ref 3.5–5)
PROT SERPL-MCNC: 6.8 G/DL — SIGNIFICANT CHANGE UP (ref 6–8)
RBC # BLD: 4.99 M/UL — SIGNIFICANT CHANGE UP (ref 4.2–5.4)
RBC # FLD: 13.4 % — SIGNIFICANT CHANGE UP (ref 11.5–14.5)
SODIUM SERPL-SCNC: 139 MMOL/L — SIGNIFICANT CHANGE UP (ref 135–146)
TROPONIN T SERPL-MCNC: <0.01 NG/ML — SIGNIFICANT CHANGE UP
WBC # BLD: 12.78 K/UL — HIGH (ref 4.8–10.8)
WBC # FLD AUTO: 12.78 K/UL — HIGH (ref 4.8–10.8)

## 2023-06-21 PROCEDURE — 99285 EMERGENCY DEPT VISIT HI MDM: CPT

## 2023-06-21 PROCEDURE — 85379 FIBRIN DEGRADATION QUANT: CPT

## 2023-06-21 PROCEDURE — 83735 ASSAY OF MAGNESIUM: CPT

## 2023-06-21 PROCEDURE — 96375 TX/PRO/DX INJ NEW DRUG ADDON: CPT

## 2023-06-21 PROCEDURE — 80053 COMPREHEN METABOLIC PANEL: CPT

## 2023-06-21 PROCEDURE — 93005 ELECTROCARDIOGRAM TRACING: CPT

## 2023-06-21 PROCEDURE — 71045 X-RAY EXAM CHEST 1 VIEW: CPT | Mod: 26

## 2023-06-21 PROCEDURE — 99285 EMERGENCY DEPT VISIT HI MDM: CPT | Mod: 25

## 2023-06-21 PROCEDURE — 84484 ASSAY OF TROPONIN QUANT: CPT

## 2023-06-21 PROCEDURE — 96374 THER/PROPH/DIAG INJ IV PUSH: CPT

## 2023-06-21 PROCEDURE — 36415 COLL VENOUS BLD VENIPUNCTURE: CPT

## 2023-06-21 PROCEDURE — 85025 COMPLETE CBC W/AUTO DIFF WBC: CPT

## 2023-06-21 PROCEDURE — 71045 X-RAY EXAM CHEST 1 VIEW: CPT

## 2023-06-21 PROCEDURE — 93010 ELECTROCARDIOGRAM REPORT: CPT

## 2023-06-21 RX ORDER — MORPHINE SULFATE 50 MG/1
2 CAPSULE, EXTENDED RELEASE ORAL ONCE
Refills: 0 | Status: DISCONTINUED | OUTPATIENT
Start: 2023-06-21 | End: 2023-06-21

## 2023-06-21 RX ORDER — MORPHINE SULFATE 50 MG/1
2 CAPSULE, EXTENDED RELEASE ORAL
Refills: 0 | Status: DISCONTINUED | OUTPATIENT
Start: 2023-06-21 | End: 2023-06-21

## 2023-06-21 RX ORDER — KETOROLAC TROMETHAMINE 30 MG/ML
15 SYRINGE (ML) INJECTION ONCE
Refills: 0 | Status: DISCONTINUED | OUTPATIENT
Start: 2023-06-21 | End: 2023-06-21

## 2023-06-21 RX ADMIN — MORPHINE SULFATE 2 MILLIGRAM(S): 50 CAPSULE, EXTENDED RELEASE ORAL at 01:52

## 2023-06-21 RX ADMIN — MORPHINE SULFATE 2 MILLIGRAM(S): 50 CAPSULE, EXTENDED RELEASE ORAL at 02:07

## 2023-06-21 RX ADMIN — Medication 15 MILLIGRAM(S): at 03:11

## 2023-06-21 NOTE — ED ADULT NURSE NOTE - NSFALLUNIVINTERV_ED_ALL_ED
Bed/Stretcher in lowest position, wheels locked, appropriate side rails in place/Call bell, personal items and telephone in reach/Instruct patient to call for assistance before getting out of bed/chair/stretcher/Non-slip footwear applied when patient is off stretcher/[place] to call system/Physically safe environment - no spills, clutter or unnecessary equipment/Purposeful proactive rounding/Room/bathroom lighting operational, light cord in reach

## 2023-06-21 NOTE — ED PROVIDER NOTE - OBJECTIVE STATEMENT
48-year-old female no significant past medical history comes to the emergency room for left lateral breast discomfort and rib pain.  Patient states that it started while sitting down and progressively got worse described as sharp in nature worse with movement worse with deep breath.  Patient states pain moves to the inside of the breast.  No breast discharge patient's last mammogram was last year and normal.  No significant family past medical history for cardiac disease

## 2023-06-21 NOTE — ED PROVIDER NOTE - CARE PROVIDER_API CALL
Mary Alice Acuña  Surgery  [address], [address]nd [place] [address]  Phone: [phone]  Fax: [phone]  Follow Up Time:    Saucerization Excision Additional Text (Leave Blank If You Do Not Want): The margin was drawn around the clinically apparent lesion.  Incisions were then made along these lines, in a tangential fashion, to the appropriate tissue plane and the lesion was extirpated.

## 2023-06-21 NOTE — ED PROVIDER NOTE - NSFOLLOWUPINSTRUCTIONS_ED_ALL_ED_FT
FOllow up with your doctor for this pain as well as  breast clinic    RETURN TO ED  FOR ANY NEW WORSENING OR CONCERNING SYMPTOMS TO YOU, ALSO AS WE DISCUSSED. WE ARE HERE AND HAPPY TO TAKE CARE OF YOU      Pleurisy: Care Instructions  Your Care Instructions  Pleurisy is inflammation of the tissue that lines the inside of the chest and covers the lungs (pleura). Pleurisy is often caused by an infection, usually a virus. It also can be caused by other health problems, such as pneumonia or lupus. Pleurisy can cause sharp chest pain that gets worse when you cough or take a deep breath.    You may need more tests to find out what is causing your pleurisy. Treatment depends on the cause. Pleurisy may come and go for a few days, or it may continue if the cause has not been treated. Home treatment can help ease symptoms.    Follow-up care is a key part of your treatment and safety. Be sure to make and go to all appointments, and call your doctor or nurse advice line (251 in most provinces and [facility]) if you are having problems. It's also a good idea to know your test results and keep a list of the medicines you take.    How can you care for yourself at home?  Take an over-the-counter pain medicine, such as acetaminophen (Tylenol), ibuprofen (Advil, Motrin), or naproxen (Aleve). Read and follow all instructions on the label.  Do not take two or more pain medicines at the same time unless the doctor told you to. Many pain medicines have acetaminophen, which is Tylenol. Too much acetaminophen (Tylenol) can be harmful.  If your doctor prescribed antibiotics, take them as directed. Do not stop taking them just because you feel better. You need to take the full course of antibiotics.  Take cough medicine as directed if your doctor recommends it.  Avoid activities that make the pain worse.  When should you call for help?  	  Call 911 anytime you think you may need emergency care. For example, call if:    You have severe trouble breathing.  You have severe chest pain.  You passed out (lost consciousness).  Call your doctor or nurse advice line now or seek immediate medical care if:    You have difficulty breathing.  You have a new or higher fever.  You cough up blood.  Watch closely for changes in your health, and be sure to contact your doctor or nurse advice line if:    You begin to cough up mucus more deeply or more often.  You are not getting better as expected.Breast pain, also called mastalgia, is a very common condition, especially among women aged 30 to 50. It affects roughly 70% of women at some point in their life.    Sometimes, you may feel a sharp breast pain that's actually coming from deep down in your chest. This is referred to as chest wall pain.    Is breast pain a sign of breast cancer?  People who are diagnosed with breast cancer will likely develop lumps in the breast area that can be quite painful. But breast pain on its own isn’t usually a sign of breast cancer.    If you have breast pain — either localized or generalized — you should schedule a visit with your healthcare provider for a routine breast examination. This will ensure that your health is being monitored and that your breast screenings are up to date.    Why do you feel breast pain during pregnancy?  During pregnancy, your body begins producing more hormones than normal, specifically estrogen. This directly affects your breasts, often making both of them painful and tender as your pregnancy progresses through the different stages.    Are there different types of breast pain?  There are two distinct types of breast pain, which are:    Cyclic breast pain. This type of breast pain lines up with your menstrual cycle. Because of that, it’s more common in 08-ch-42-year-old women — and it’s particularly common in women who are on the younger end of that range. You may have breast pain in one breast or both, and it typically occurs due to natural hormonal shifts. Roughly a week before your period, you may start to feel a little pain in the upper, outer parts of your breasts. Cyclic breast pain goes away naturally once you get your period, but returns during your next menstrual cycle. Most women don’t experience this type of breast pain after menopause.  Noncyclic breast pain. This type of breast pain has nothing to do with your menstrual cycle, and is more common in 35-od-30-year-old women.  SYMPTOMS AND CAUSES  What causes breast pain?  Cyclic breast pain is often triggered by your natural menstrual cycle, but noncyclic breast pain causes may include:    Birth control pills.  Pregnancy.  Infection.  Inflammation.  Infertility medications.  Hormone therapy.  Prior radiation therapy.  Antidepressants.  Breast cysts.  Fibroadenomas (non-cancerous lumps).  Costochondritis (inflammation where your bones and cartilage meet in your rib cage).  Having larger breasts.  Surgical scars.  There are also certain activities that can cause noncyclic breast pain, including:    Smoking.  Caffeine consumption.  Stress.  Breastfeeding.  In many cases, there is no clear cause for breast pain.    What are the symptoms of breast pain?  Your symptoms will differ depending on whether you’re suffering from cyclic or noncyclic breast pain. During cyclic breast pain, your breasts will likely feel:    Tender.  Swollen.  Heavy.  Sore.  The severity of the pain can be different for everyone, and for some, it may spread to your armpit and shoulders too. The symptoms of noncyclic breast pain are a little different, and are as follows:    The pain will be limited to one specific area in your breast.  A sharper, more acute pain.  A burning, stabbing sensation in that one area.  These symptoms of noncyclic breast pain may come and go over time, or remain for a longer period.    DIAGNOSIS AND TESTS  How is breast pain diagnosed?  Before performing a breast exam, your doctor will ask you about your symptoms, how long you’ve been experiencing them and the severity and frequency of your pain. During the breast exam, they'll examine your breasts for any possible lumps. They may also ask you to lean forward during the exam to assess whether the pain is coming from your breast or from inside your chest. Your doctor may also order a mammogram or an ultrasound, but whether or not you need either of these imaging tests will depend on a few different factors, including:    Your specific type of pain.  The findings from the breast exam.  Your age.  The length of time between your last breast-imaging procedure.  MANAGEMENT AND TREATMENT  How is breast pain treated?  Since cyclic breast pain is a regular part of your menstrual cycle, it doesn’t require as much treatment. There are some pain-relieving medications you can take to help with your symptoms if the pain is becoming unmanageable, including:    Acetaminophen (Tylenol®, Paracetamol®).  Ibuprofen (Advil®, Motrin®).  Naproxen sodium (Aleve®, Naprosyn®).  Aspirin (Anacin®, Alfredo®).  Diclofenac (Voltaren®).  If your breast pain is even more severe, your doctor may suggest Danazol or Tamoxifen, which are two prescription medications. But these two medications also have certain side effects, which is why it’s important to talk with your doctor before taking them, to determine whether it will be worth it for your specific case.    There are also a few other treatment methods that you can use to help relieve your pain, including:    Wearing a well-fitting, supportive bra. (Breast size and shape changes over time. So, make sure you’re routinely fitted for the correct bra size.)  Taking Vitamin E supplements and other multivitamins.  Eliminating caffeine from your diet.  Avoiding tobacco products.  Using evening primrose oil.  Applying heat to the most painful area on your breast, being sure to protect your skin.  Taking magnesium supplements. If you take these during your menstrual cycle, roughly two weeks before your period, it can help relieve some of your symptoms.  Applying over-the-counter trolamine salicylate cream to ease aches and pains.  Relaxation and complementary therapy.  Your healthcare provider may suggest using some of the same medications to treat the symptoms of noncyclic breast pain. However, if they can find the underlying cause of your pain (such as a fibroadenoma, a cyst or a benign lump), they can remove it and relieve your symptoms.    PREVENTION  Can you prevent breast pain?  Since cyclic breast pain is a regular part of your menstrual cycle, you can’t exactly prevent it. But there are certain lifestyle changes you can make to ensure that your symptoms won’t be as bad, and to help prevent noncyclic breast pain, which include:    Drastically reducing your caffeine intake, or cutting it out altogether.  Eating a very low-fat diet.  Wearing a supportive sports bra that fits really well, especially while you’re exercising.  OUTLOOK / PROGNOSIS  How long does breast pain last?  Before menopause, you’ll likely experience cyclic breast pain during every menstrual cycle. Though breast pain is a relatively constant issue, the severity of the pain depends on how you decide to treat it — through specific pain-relieving medications and certain lifestyle changes. Noncyclic breast pain can also be treated and managed with these different methods. Fortunately, it’s relatively easy to obtain these medications or make beneficial lifestyle changes, making this condition something that shouldn’t cause you much trouble at all.    LIVING WITH  When should I see my healthcare provider about my breast pain?  If you notice that your breast pain hasn’t gone away after three weeks, contact your healthcare provider immediately. They can help determine the cause of your pain and design a treatment plan to fit your needs.    A note from [facility]    While breast pain can cause significant discomfort, it’s usually not serious. Fortunately, symptoms can typically be managed with simple self-care practices. If you develop other symptoms along with breast pain — such as lumps, changes in the texture of your breasts or discharge from your nipples — schedule a consultation with your healthcare provider.

## 2023-06-21 NOTE — ED PROVIDER NOTE - PATIENT PORTAL LINK FT
You can access the FollowMyHealth Patient Portal offered by [facility] by registering at the following website: [email or web address]. By joining [employer]’s FollowMyHealth portal, you will also be able to view your health information using other applications (apps) compatible with our system.

## 2023-06-21 NOTE — ED PROVIDER NOTE - CLINICAL SUMMARY MEDICAL DECISION MAKING FREE TEXT BOX
48-year-old female no past medical history no hypercoagulability presents with "breast pain" patient points to left chest wall nontender to palpation skin no erythema no abscess no mass, pain triggered by deep breath.  No PE risk factors low risk D-dimer negative troponin negative EKG independent interpretation of EKG  -  nsr no stemi no acute ischemia  independent interpretation of CXR -  no ptx no opacity not wide  no water bottle heart  No CVA tenderness no left upper quadrant tenderness.  Labs reviewed WNL.  Patient will follow-up with her primary doctor and breast clinic as an outpatient

## 2023-06-21 NOTE — ED PROVIDER NOTE - PHYSICAL EXAMINATION
Physical Exam    Vital Signs: I have reviewed the initial vital signs.  Constitutional: well-nourished, appears stated age, no acute distress  Eyes: Conjunctiva pink, Sclera clear, PERRLA, EOMI.  Cardiovascular: S1 and S2, regular rate, regular rhythm, well-perfused extremities, radial pulses equal and 2+  Respiratory: unlabored respiratory effort, clear to auscultation bilaterally no wheezing, rales and rhonchi  Gastrointestinal: soft, non-tender abdomen, no pulsatile mass, normal bowl sounds  Musculoskeletal: supple neck, no lower extremity edema, no midline tenderness  Integumentary: warm, dry, no rash, breast exam as per Dr. Sullivan  Neurologic: awake, alert, cranial nerves II-XII grossly intact, extremities’ motor and sensory functions grossly intact  Psychiatric: appropriate mood, appropriate affect

## 2024-10-01 NOTE — ED ADULT NURSE NOTE - NS ED NURSE IV DC DT
Called patient; no answer; left message to remind patient that her 6 months follow is due in January 2025 and to call back office to schedule an appointment   21-Jun-2023 03:05